# Patient Record
Sex: FEMALE | Race: WHITE | NOT HISPANIC OR LATINO | Employment: OTHER | URBAN - METROPOLITAN AREA
[De-identification: names, ages, dates, MRNs, and addresses within clinical notes are randomized per-mention and may not be internally consistent; named-entity substitution may affect disease eponyms.]

---

## 2023-02-28 ENCOUNTER — OFFICE VISIT (OUTPATIENT)
Dept: OTOLARYNGOLOGY | Facility: CLINIC | Age: 73
End: 2023-02-28
Payer: MEDICARE

## 2023-02-28 ENCOUNTER — PATIENT ROUNDING (BHMG ONLY) (OUTPATIENT)
Dept: OTOLARYNGOLOGY | Facility: CLINIC | Age: 73
End: 2023-02-28
Payer: MEDICARE

## 2023-02-28 VITALS — WEIGHT: 148 LBS | TEMPERATURE: 97.5 F | BODY MASS INDEX: 24.66 KG/M2 | HEIGHT: 65 IN

## 2023-02-28 DIAGNOSIS — K13.70 ORAL LESION: Primary | ICD-10-CM

## 2023-02-28 DIAGNOSIS — Q35.9 CLEFT PALATE: ICD-10-CM

## 2023-02-28 PROCEDURE — 99203 OFFICE O/P NEW LOW 30 MIN: CPT | Performed by: OTOLARYNGOLOGY

## 2023-02-28 RX ORDER — LORAZEPAM 1 MG/1
1 TABLET ORAL EVERY 24 HOURS
COMMUNITY

## 2023-02-28 RX ORDER — UBIDECARENONE 100 MG
100 CAPSULE ORAL DAILY
COMMUNITY

## 2023-02-28 RX ORDER — NYSTATIN 100000 U/G
OINTMENT TOPICAL
COMMUNITY
Start: 2023-02-04

## 2023-02-28 RX ORDER — ERGOCALCIFEROL 1.25 MG/1
CAPSULE ORAL
COMMUNITY

## 2023-02-28 RX ORDER — METOPROLOL SUCCINATE 100 MG/1
TABLET, EXTENDED RELEASE ORAL
COMMUNITY
Start: 2023-02-03

## 2023-02-28 RX ORDER — CALCIUM CARBONATE/VITAMIN D3 500 MG-10
TABLET ORAL EVERY 12 HOURS SCHEDULED
COMMUNITY
Start: 2023-04-10

## 2023-02-28 RX ORDER — LISINOPRIL 40 MG/1
TABLET ORAL
COMMUNITY
Start: 2023-02-03

## 2023-02-28 RX ORDER — ROSUVASTATIN CALCIUM 5 MG/1
TABLET, COATED ORAL
COMMUNITY
Start: 2023-02-03

## 2023-02-28 NOTE — PROGRESS NOTES
February 28, 2023    Hello, may I speak with Natty Anguiano?    My name is Daniela       I am  with [facility]  [address]  [place] [address] [phone].    Before we get started may I verify your date of birth? 1950    I am calling to officially welcome you to our practice and ask about your recent visit. Is this a good time to talk? yes    Tell me about your visit with us. What things went well?  visit went mariel       We're always looking for ways to make our patients' experiences even better. Do you have recommendations on ways we may improve?  no    Overall were you satisfied with your first visit to our practice? yes       I appreciate you taking the time to speak with me today. Is there anything else I can do for you? no      Thank you, and have a great day.

## 2023-02-28 NOTE — PROGRESS NOTES
Patient Name: Natty Anguiano   Visit Date: 02/28/2023   Patient ID: [id number]  Provider: Raul Alcala MD    Sex: female  Location: [facility] [place] [place] [facility]   YOB: 1950  Location Address: [address] [place] [address], [address],?[place]?[address]    Primary Care Provider Kya Fraser APRN  Location Phone: [phone]    Referring Provider: No ref. provider found        Chief Complaint  white patches on throat  (Pt states she is here for red spot in mouth  New patient )    Subjective    History of Present Illness  Natty Anguiano is a 72 y.o. female who presents to [facility] today as a consult from No ref. provider found.    She presents to the clinic today for evaluation of a small vascular lesion along the soft palate that was initially noted by her dentist several months ago.  She notes no symptoms related to this, and was sent here for further evaluation.  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  She has not noted any progression of the area.  Overall she feels well.  She has never been a heavy smoker or drinker.  She does have some issues with GERD, and was previously on Pepcid for this.    Past Medical History:   Diagnosis Date   • Eye disease    • High cholesterol    • HTN (hypertension)    • Vitamin D deficiency        Past Surgical History:   Procedure Laterality Date   • CLEFT PALATE REPAIR     • EYE SURGERY     • HYSTERECTOMY           Current Outpatient Medications:   •  coenzyme Q10 100 MG capsule, Take 1 capsule by mouth Daily., Disp: , Rfl:   •  ergocalciferol (ERGOCALCIFEROL) 1.25 MG (62484 UT) capsule, ergocalciferol (vitamin D2) 1,250 mcg (50,000 unit) capsule, Disp: , Rfl:   •  lisinopril (PRINIVIL,ZESTRIL) 40 MG tablet, , Disp: , Rfl:   •  LORazepam (ATIVAN) 1 MG tablet, 1 tablet Daily., Disp: , Rfl:   •  metoprolol succinate XL (TOPROL-XL) 100 MG 24 hr tablet, , Disp: , Rfl:   •   "rosuvastatin (CRESTOR) 5 MG tablet, , Disp: , Rfl:   •  [START ON 4/10/2023] Calcium Carb-Cholecalciferol 500-10 MG-MCG tablet, Every 12 (Twelve) Hours., Disp: , Rfl:   •  mupirocin (BACTROBAN) 2 % ointment, , Disp: , Rfl:   •  nystatin (MYCOSTATIN) 078663 UNIT/GM ointment, , Disp: , Rfl:      Allergies   Allergen Reactions   • Escitalopram Other (See Comments)     Ended up in hospital with low sodium levels after that     • Hydrocodone-Acetaminophen Other (See Comments)     Vicodin pt states makes her feel weird     • Hydrochlorothiazide Unknown - High Severity     Pt unaware of        Family History   Problem Relation Age of Onset   • No Known Problems Mother    • No Known Problems Father         Social History     Social History Narrative   • Not on file       Objective     Vital Signs:   Temp 97.5 °F (36.4 °C) (Tympanic)   Ht 165.1 cm (65\")   Wt 67.1 kg (148 lb)   BMI 24.63 kg/m²       Physical Exam         Constitutional   Appearance  · : well developed, well-nourished, alert and in no acute distress, voice clear and strong    Head  Inspection  · : no deformities or lesions  Face  Inspection  · : No facial lesions; House-Brackmann I/VI bilaterally  Palpation  · : No TMJ crepitus nor  muscle tenderness bilaterally    Eyes  Vision  Visual Fields  · : Extraocular movements are intact. No spontaneous or gaze-induced nystagmus.  Conjunctivae  · : clear  Sclerae  · : clear  Pupils and Irises  · : pupils equal, round, and reactive to light.     Ears, Nose, Mouth and Throat    Ears    External Ears  · : appearance within normal limits, no lesions present  Otoscopic Examination  · : Tympanic membrane appearance within normal limits bilaterally without perforations, well-aerated middle ears  Hearing  · : intact to conversational voice both ears  Tunning fork testing:     :    Nose    External Nose  · : appearance normal  Intranasal Exam  · : mucosa within normal limits, vestibules normal, no intranasal " lesions present, septum midline, sinuses non tender to percussion  Oral Cavity    Oral Mucosa  · : oral mucosa normal without pallor or cyanosis  Lips  · : lip appearance normal  Teeth  · : normal dentition for age  Gums  · : gums pink, non-swollen, no bleeding present  Tongue  · : tongue appearance normal; normal mobility  Palate  · : hard palate normal, soft palate status post cleft palate repair with normal postsurgical changes.  There is a prominent appearing arterial along the left soft palate with no associated ulceration or lesion    Throat    Oropharynx  · : no inflammation or lesions present, tonsils within normal limits  Hypopharynx  · : appearance within normal limits, superior epiglottis within normal limits  Larynx  · : appearance within normal limits, vocal cords within normal limits, no lesions present    Neck  Inspection/Palpation  · : normal appearance, no masses or tenderness, trachea midline; thyroid size normal, nontender, no nodules or masses present on palpation    Respiratory  Respiratory Effort  · : breathing unlabored  Inspection of Chest  · : normal appearance, no retractions    Cardiovascular  Heart  · : regular rate and rhythm    Lymphatic  Neck  · : no lymphadenopathy present  Supraclavicular Nodes  · : no lymphadenopathy present  Preauricular Nodes  · : no lymphadenopathy present    Skin and Subcutaneous Tissue  General Inspection  · : Regarding face and neck - there are no rashes present, no lesions present, and no areas of discoloration    Neurologic  Cranial Nerves  · : cranial nerves II-XII are grossly intact bilaterally  Gait and Station  · : normal gait, able to stand without diffculty    Psychiatric  Judgement and Insight  · : judgment and insight intact  Mood and Affect  · : mood normal, affect appropriate          Assessment and Plan    Diagnoses and all orders for this visit:    1. Oral lesion (Primary)    2. Cleft palate    Examination today reveals cleft palate repair with  postsurgical changes.  The area of interest appears to be a prominent arterial without any evidence of ulceration.  I discussed observing the site and if there is any progression the area can be biopsied.  If it appears to be stable, it may be related to her previous palate surgery.  I have asked that she contact me should there be any changes for reevaluation.    Follow Up   No follow-ups on file.  Patient was given instructions and counseling regarding her condition or for health maintenance advice. Please see specific information pulled into the AVS if appropriate.

## 2024-06-11 ENCOUNTER — OFFICE VISIT (OUTPATIENT)
Dept: OTOLARYNGOLOGY | Facility: CLINIC | Age: 74
End: 2024-06-11
Payer: MEDICARE

## 2024-06-11 VITALS — BODY MASS INDEX: 25.43 KG/M2 | HEIGHT: 65 IN | TEMPERATURE: 97.5 F | WEIGHT: 152.6 LBS

## 2024-06-11 DIAGNOSIS — Q35.9 CLEFT PALATE: Primary | ICD-10-CM

## 2024-06-11 DIAGNOSIS — H92.01 RIGHT EAR PAIN: ICD-10-CM

## 2024-06-11 DIAGNOSIS — H61.21 IMPACTED CERUMEN OF RIGHT EAR: ICD-10-CM

## 2024-06-12 PROBLEM — H92.01 RIGHT EAR PAIN: Status: ACTIVE | Noted: 2024-06-12

## 2024-06-12 PROBLEM — H61.21 IMPACTED CERUMEN OF RIGHT EAR: Status: ACTIVE | Noted: 2024-06-12

## 2024-06-12 NOTE — PROGRESS NOTES
Patient Name: Natty Anguiano   Visit Date: 06/11/2024   Patient ID: [id number]  Provider: Rual Alcala MD    Sex: female  [place] [facility]   YOB: 1950  Location Address: [address] [place] [address], [address],?[place]?[address]    Primary Care Provider Kya Fraser APRN  Location Phone: [phone]    Referring Provider: RYNE Cole        Chief Complaint  Follow-up (disorders of middle ear and mastoid)    Subjective    History of Present Illness  Natty Anguiano is a 74 y.o. female who presents to [facility] today as a consult from RYNE Cole.    She presents to the clinic today for evaluation of ear pain and right facial pain that initially started around Thanksgiving time of last year.  She informs me that she did not have any significant changes in her hearing, but it was noted that she may have had fluid in the middle ear by her primary care physician.  She does have a history of eustachian tube dysfunction and had several surgeries for cleft palate.  She may have had eustachian tube dysfunction and recurrent ear infections as a child as well.  She notes that she was having some pain around the right maxillary sinus radiating towards the ear.  Denies any pain with chewing.  Since that time her symptoms have resolved and she is not having any symptoms at all at this point.  She notes that her hearing is normal.  She denies any ear pressure or pain.  She did not have any major sinus issues or issues with nasal congestion during this time.    Past Medical History:   Diagnosis Date    Eye disease     High cholesterol     HTN (hypertension)     Vitamin D deficiency        Past Surgical History:   Procedure Laterality Date    CLEFT PALATE REPAIR      EYE SURGERY      HYSTERECTOMY           Current Outpatient Medications:     Calcium Carb-Cholecalciferol 500-10 MG-MCG tablet, Every 12 (Twelve) Hours., Disp: , Rfl:     coenzyme Q10  "100 MG capsule, Take 1 capsule by mouth Daily., Disp: , Rfl:     ergocalciferol (ERGOCALCIFEROL) 1.25 MG (77625 UT) capsule, ergocalciferol (vitamin D2) 1,250 mcg (50,000 unit) capsule, Disp: , Rfl:     lisinopril (PRINIVIL,ZESTRIL) 40 MG tablet, , Disp: , Rfl:     metoprolol succinate XL (TOPROL-XL) 100 MG 24 hr tablet, , Disp: , Rfl:     rosuvastatin (CRESTOR) 5 MG tablet, , Disp: , Rfl:     LORazepam (ATIVAN) 1 MG tablet, 1 tablet Daily. (Patient not taking: Reported on 6/11/2024), Disp: , Rfl:     mupirocin (BACTROBAN) 2 % ointment, , Disp: , Rfl:     nystatin (MYCOSTATIN) 630102 UNIT/GM ointment, , Disp: , Rfl:      Allergies   Allergen Reactions    Escitalopram Other (See Comments)     Ended up in hospital with low sodium levels after that      Hydrocodone-Acetaminophen Other (See Comments)     Vicodin pt states makes her feel weird      Hydrochlorothiazide Unknown - High Severity     Pt unaware of        Family History   Problem Relation Age of Onset    No Known Problems Mother     No Known Problems Father         Social History     Social History Narrative    Not on file       Objective     Vital Signs:   Temp 97.5 °F (36.4 °C) (Tympanic)   Ht 165.1 cm (65\")   Wt 69.2 kg (152 lb 9.6 oz)   BMI 25.39 kg/m²       Physical Exam    Constitutional   Appearance  : well developed, well-nourished, alert and in no acute distress, voice clear and strong    Head  Inspection  : no deformities or lesions  Face  Inspection  : No facial lesions; House-Brackmann I/VI bilaterally  Palpation  : No TMJ crepitus nor  muscle tenderness bilaterally    Eyes  Vision  Visual Fields  : Extraocular movements are intact. No spontaneous or gaze-induced nystagmus.  Conjunctivae  : clear  Sclerae  : clear  Pupils and Irises  : pupils equal, round, and reactive to light.     Ears, Nose, Mouth and Throat    Ears    External Ears  : appearance within normal limits, no lesions present  Otoscopic Examination  : Cerumen impactions " cleared with curettes and microscope, tympanic membrane appearance with some myringosclerosis on the right and mild retraction on the left but otherwise within normal limits bilaterally without perforations, well-aerated middle ears  Hearing  : intact to conversational voice both ears  Tunning fork testing:     :    Nose    External Nose  : appearance normal  Intranasal Exam  : mucosa within normal limits, vestibules normal, no intranasal lesions present, septum midline, sinuses non tender to percussion  Oral Cavity    Oral Mucosa  : oral mucosa normal without pallor or cyanosis  Lips  : lip appearance normal  Teeth  : normal dentition for age  Gums  : gums pink, non-swollen, no bleeding present  Tongue  : tongue appearance normal; normal mobility  Palate  : hard palate normal, soft palate appearance normal with symmetric mobility    Throat    Oropharynx  : no inflammation or lesions present, tonsils within normal limits  Hypopharynx  : appearance within normal limits, superior epiglottis within normal limits  Larynx  : appearance within normal limits, vocal cords within normal limits, no lesions present    Neck  Inspection/Palpation  : normal appearance, no masses or tenderness, trachea midline; thyroid size normal, nontender, no nodules or masses present on palpation    Respiratory  Respiratory Effort  : breathing unlabored  Inspection of Chest  : normal appearance, no retractions    Cardiovascular  Heart  : regular rate and rhythm    Lymphatic  Neck  : no lymphadenopathy present  Supraclavicular Nodes  : no lymphadenopathy present  Preauricular Nodes  : no lymphadenopathy present    Skin and Subcutaneous Tissue  General Inspection  : Regarding face and neck - there are no rashes present, no lesions present, and no areas of discoloration    Neurologic  Cranial Nerves  : cranial nerves II-XII are grossly intact bilaterally  Gait and Station  : normal gait, able to stand without diffculty    Psychiatric  Judgement  and Insight  : judgment and insight intact  Mood and Affect  : mood normal, affect appropriate     Ear Cerumen Removal    Date/Time: 6/12/2024 10:05 AM    Performed by: Raul Alcala MD  Authorized by: Raul Alcala MD    Anesthesia:  Local Anesthetic: none  Location details: left ear and right ear  Procedure type: instrumentation, curette   Sedation:  Patient sedated: no                Assessment and Plan    Diagnoses and all orders for this visit:    1. Cleft palate (Primary)    2. Right ear pain    3. Impacted cerumen of right ear    Other orders  -     Ear Cerumen Removal    Exam today revealed cerumen which I was able to remove.  She has some myringosclerosis on the right side and mild retraction on the left side but overall well aerated middle ears.  I did not see any evidence of fluid or infection.  TMJs were nontender.  She does not have any trismus.  Nasal exam was also normal.  I am not sure exactly what was going on, but it seems to and she is having no symptoms at this point.  We discussed some possibilities and I will be glad to see her back in the clinic for reevaluation if the symptoms become active again.    Follow Up   No follow-ups on file.  Patient was given instructions and counseling regarding her condition or for health maintenance advice. Please see specific information pulled into the AVS if appropriate.